# Patient Record
Sex: MALE | Race: ASIAN | Employment: OTHER | URBAN - METROPOLITAN AREA
[De-identification: names, ages, dates, MRNs, and addresses within clinical notes are randomized per-mention and may not be internally consistent; named-entity substitution may affect disease eponyms.]

---

## 2019-12-23 ENCOUNTER — HOSPITAL ENCOUNTER (INPATIENT)
Facility: HOSPITAL | Age: 79
LOS: 3 days | Discharge: HOME | DRG: 272 | End: 2019-12-26
Attending: EMERGENCY MEDICINE | Admitting: SURGERY
Payer: MEDICAID

## 2019-12-23 ENCOUNTER — APPOINTMENT (OUTPATIENT)
Dept: GENERAL RADIOLOGY | Facility: HOSPITAL | Age: 79
DRG: 272 | End: 2019-12-23
Attending: EMERGENCY MEDICINE
Payer: MEDICAID

## 2019-12-23 ENCOUNTER — APPOINTMENT (OUTPATIENT)
Dept: INTERVENTIONAL RADIOLOGY/VASCULAR | Facility: HOSPITAL | Age: 79
DRG: 272 | End: 2019-12-23
Attending: SURGERY
Payer: MEDICAID

## 2019-12-23 ENCOUNTER — APPOINTMENT (OUTPATIENT)
Dept: ULTRASOUND IMAGING | Facility: HOSPITAL | Age: 79
DRG: 272 | End: 2019-12-23
Attending: EMERGENCY MEDICINE
Payer: MEDICAID

## 2019-12-23 DIAGNOSIS — D64.9 NORMOCYTIC ANEMIA: ICD-10-CM

## 2019-12-23 DIAGNOSIS — I82.4Y1 ACUTE DEEP VEIN THROMBOSIS (DVT) OF PROXIMAL VEIN OF RIGHT LOWER EXTREMITY (HCC): Primary | ICD-10-CM

## 2019-12-23 PROCEDURE — 04CH3ZZ EXTIRPATION OF MATTER FROM RIGHT EXTERNAL ILIAC ARTERY, PERCUTANEOUS APPROACH: ICD-10-PCS | Performed by: SURGERY

## 2019-12-23 PROCEDURE — 93971 EXTREMITY STUDY: CPT | Performed by: EMERGENCY MEDICINE

## 2019-12-23 PROCEDURE — 04CK3ZZ EXTIRPATION OF MATTER FROM RIGHT FEMORAL ARTERY, PERCUTANEOUS APPROACH: ICD-10-PCS | Performed by: SURGERY

## 2019-12-23 PROCEDURE — 04CM3ZZ EXTIRPATION OF MATTER FROM RIGHT POPLITEAL ARTERY, PERCUTANEOUS APPROACH: ICD-10-PCS | Performed by: SURGERY

## 2019-12-23 PROCEDURE — 3E05317 INTRODUCTION OF OTHER THROMBOLYTIC INTO PERIPHERAL ARTERY, PERCUTANEOUS APPROACH: ICD-10-PCS | Performed by: SURGERY

## 2019-12-23 PROCEDURE — B519YZZ FLUOROSCOPY OF INFERIOR VENA CAVA USING OTHER CONTRAST: ICD-10-PCS | Performed by: SURGERY

## 2019-12-23 PROCEDURE — B51FYZZ FLUOROSCOPY OF RIGHT PELVIC (ILIAC) VEINS USING OTHER CONTRAST: ICD-10-PCS | Performed by: SURGERY

## 2019-12-23 PROCEDURE — 99223 1ST HOSP IP/OBS HIGH 75: CPT | Performed by: HOSPITALIST

## 2019-12-23 PROCEDURE — 71045 X-RAY EXAM CHEST 1 VIEW: CPT | Performed by: EMERGENCY MEDICINE

## 2019-12-23 PROCEDURE — B51BYZZ FLUOROSCOPY OF RIGHT LOWER EXTREMITY VEINS USING OTHER CONTRAST: ICD-10-PCS | Performed by: SURGERY

## 2019-12-23 PROCEDURE — 06HY33Z INSERTION OF INFUSION DEVICE INTO LOWER VEIN, PERCUTANEOUS APPROACH: ICD-10-PCS | Performed by: SURGERY

## 2019-12-23 RX ORDER — MIDAZOLAM HYDROCHLORIDE 1 MG/ML
INJECTION INTRAMUSCULAR; INTRAVENOUS
Status: COMPLETED
Start: 2019-12-23 | End: 2019-12-23

## 2019-12-23 RX ORDER — GLIMEPIRIDE 2 MG/1
2 TABLET ORAL NIGHTLY
COMMUNITY
End: 2021-03-15 | Stop reason: ALTCHOICE

## 2019-12-23 RX ORDER — HEPARIN SODIUM AND DEXTROSE 10000; 5 [USP'U]/100ML; G/100ML
INJECTION INTRAVENOUS CONTINUOUS
Status: DISCONTINUED | OUTPATIENT
Start: 2019-12-23 | End: 2019-12-24

## 2019-12-23 RX ORDER — HYDRALAZINE HYDROCHLORIDE 20 MG/ML
INJECTION INTRAMUSCULAR; INTRAVENOUS
Status: COMPLETED
Start: 2019-12-23 | End: 2019-12-23

## 2019-12-23 RX ORDER — HEPARIN SODIUM AND DEXTROSE 10000; 5 [USP'U]/100ML; G/100ML
400 INJECTION INTRAVENOUS CONTINUOUS
Status: DISCONTINUED | OUTPATIENT
Start: 2019-12-23 | End: 2019-12-24

## 2019-12-23 RX ORDER — HEPARIN SODIUM 5000 [USP'U]/ML
80 INJECTION INTRAVENOUS; SUBCUTANEOUS ONCE
Status: COMPLETED | OUTPATIENT
Start: 2019-12-23 | End: 2019-12-23

## 2019-12-23 RX ORDER — MORPHINE SULFATE 4 MG/ML
4 INJECTION, SOLUTION INTRAMUSCULAR; INTRAVENOUS EVERY 2 HOUR PRN
Status: DISCONTINUED | OUTPATIENT
Start: 2019-12-23 | End: 2019-12-24

## 2019-12-23 RX ORDER — SODIUM CHLORIDE 9 MG/ML
INJECTION, SOLUTION INTRAVENOUS CONTINUOUS
Status: DISCONTINUED | OUTPATIENT
Start: 2019-12-23 | End: 2019-12-24

## 2019-12-23 RX ORDER — DEXTROSE AND SODIUM CHLORIDE 5; .45 G/100ML; G/100ML
INJECTION, SOLUTION INTRAVENOUS CONTINUOUS
Status: DISCONTINUED | OUTPATIENT
Start: 2019-12-23 | End: 2019-12-25

## 2019-12-23 RX ORDER — HYDRALAZINE HYDROCHLORIDE 20 MG/ML
10 INJECTION INTRAMUSCULAR; INTRAVENOUS
Status: DISCONTINUED | OUTPATIENT
Start: 2019-12-23 | End: 2019-12-26

## 2019-12-23 RX ORDER — HEPARIN SODIUM 10000 [USP'U]/100ML
INJECTION, SOLUTION INTRAVENOUS
Status: DISPENSED
Start: 2019-12-23 | End: 2019-12-24

## 2019-12-23 RX ORDER — HEPARIN SODIUM 5000 [USP'U]/ML
INJECTION, SOLUTION INTRAVENOUS; SUBCUTANEOUS
Status: COMPLETED
Start: 2019-12-23 | End: 2019-12-23

## 2019-12-23 RX ORDER — ACETAMINOPHEN 325 MG/1
650 TABLET ORAL EVERY 6 HOURS PRN
Status: DISCONTINUED | OUTPATIENT
Start: 2019-12-23 | End: 2019-12-26

## 2019-12-23 RX ORDER — AMLODIPINE BESYLATE 10 MG/1
10 TABLET ORAL NIGHTLY
Status: ON HOLD | COMMUNITY
End: 2019-12-26

## 2019-12-23 RX ORDER — MORPHINE SULFATE 4 MG/ML
2 INJECTION, SOLUTION INTRAMUSCULAR; INTRAVENOUS EVERY 2 HOUR PRN
Status: DISCONTINUED | OUTPATIENT
Start: 2019-12-23 | End: 2019-12-24

## 2019-12-23 RX ORDER — ONDANSETRON 2 MG/ML
4 INJECTION INTRAMUSCULAR; INTRAVENOUS EVERY 6 HOURS PRN
Status: DISCONTINUED | OUTPATIENT
Start: 2019-12-23 | End: 2019-12-24

## 2019-12-23 RX ORDER — DEXTROSE AND SODIUM CHLORIDE 5; .45 G/100ML; G/100ML
INJECTION, SOLUTION INTRAVENOUS CONTINUOUS
Status: DISCONTINUED | OUTPATIENT
Start: 2019-12-23 | End: 2019-12-24

## 2019-12-23 RX ORDER — MORPHINE SULFATE 4 MG/ML
1 INJECTION, SOLUTION INTRAMUSCULAR; INTRAVENOUS EVERY 2 HOUR PRN
Status: DISCONTINUED | OUTPATIENT
Start: 2019-12-23 | End: 2019-12-24

## 2019-12-23 RX ORDER — LIDOCAINE HYDROCHLORIDE 10 MG/ML
INJECTION, SOLUTION EPIDURAL; INFILTRATION; INTRACAUDAL; PERINEURAL
Status: COMPLETED
Start: 2019-12-23 | End: 2019-12-23

## 2019-12-23 RX ORDER — AMLODIPINE BESYLATE 10 MG/1
10 TABLET ORAL NIGHTLY
Status: DISCONTINUED | OUTPATIENT
Start: 2019-12-23 | End: 2019-12-25

## 2019-12-23 RX ORDER — HEPARIN SODIUM AND DEXTROSE 10000; 5 [USP'U]/100ML; G/100ML
18 INJECTION INTRAVENOUS ONCE
Status: DISCONTINUED | OUTPATIENT
Start: 2019-12-23 | End: 2019-12-23

## 2019-12-23 RX ORDER — DEXTROSE MONOHYDRATE 25 G/50ML
50 INJECTION, SOLUTION INTRAVENOUS
Status: DISCONTINUED | OUTPATIENT
Start: 2019-12-23 | End: 2019-12-26

## 2019-12-23 RX ORDER — MORPHINE SULFATE 4 MG/ML
INJECTION, SOLUTION INTRAMUSCULAR; INTRAVENOUS
Status: DISPENSED
Start: 2019-12-23 | End: 2019-12-24

## 2019-12-23 RX ORDER — AMLODIPINE BESYLATE 10 MG/1
10 TABLET ORAL NIGHTLY
Status: DISCONTINUED | OUTPATIENT
Start: 2019-12-24 | End: 2019-12-25

## 2019-12-23 NOTE — H&P
[facility]  History and Physical     Abi Huyen Patient Status:  Emergency    1940 MRN [id number]   Location [address] [facility] Attending Radha Ch MD   Hosp Day # 0 PCP [facility]     Chief Complaint: Right Moist mucous membranes. Respiratory: Clear to auscultation bilaterally. Cardiovascular: S1, S2. Regular rate and rhythm. Equal pulses. Abdomen: Soft, nontender, nondistended. Positive bowel sounds. No rebound, guarding or organomegaly.   Musculoskele

## 2019-12-23 NOTE — PROGRESS NOTES
Patient seen and examined  Full consult to follow   Will plan catheter directed thrombolysis later today

## 2019-12-23 NOTE — ED PROVIDER NOTES
Patient Seen in: [facility] Emergency Department      History   Patient presents with:  Swelling Edema    Stated Complaint: R lower ext swelling, denies injury     HPI    19-year-old male comes in the hospital complaint of having difficulty with swe PEERL, throat clear, neck supple, no JVD, trachea midline, No LAD  Heart: S1S2 normal. No murmurs, regular rate and rhythm  Lungs: Clear to auscultation bilaterally  Abdomen: Soft nontender nondistended normal active bowel sounds without rebound, guarding volumes. Mild cardiomegaly with minimal vascular redistribution. No interstitial edema or CHF. No significant effusion. Trace discoid  atelectasis in the lung bases. No pneumothorax.     Dictated by: Jack Yadav MD on 12/23/2019 at 12:23     Chandra Baker

## 2019-12-23 NOTE — CONSULTS
[facility]  Vascular Surgery Consultation    Dontrell Anneanastacio Patient Status:  Emergency    1940 MRN [id number]   Location [address] [facility] [address] Attending No att. providers found   Hosp Day # 0 PCP Bill Hutchins     Reason for Consu thyroid wnl  CAROTID: No bruits  RESPIRATORY: no rales, rhonchi, or wheezes B  CARDIO: RRR without murmur, no murmur, no gallop   ABDOMEN: soft, non-tender with no palpable aneurysm or masses  BACK: normal, no tenderness  SKIN: no rashes, no suspicious les

## 2019-12-23 NOTE — ED NOTES
TALKED TO RAMSEY  THE PATIENT HAS A BRACELET ON THAT CAN NOT COME OFF  THEY ARE OK WITH THE BRACELET STAYING ON

## 2019-12-23 NOTE — BRIEF OP NOTE
Pre-Operative Diagnosis: Right iliofemoral DVT     Post-Operative Diagnosis: Same     Procedure Performed:   1. Ultrasound-guided percutaneous access right popliteal vein  2. Venogram and venacavogram  3. AngioJet thrombectomy  4.   Placement of Cragg McN

## 2019-12-23 NOTE — ED INITIAL ASSESSMENT (HPI)
Pt to ed with rpts of rt leg swelling that began 2mos ago, began to \"go up leg a month ago. \" pain 8/10

## 2019-12-24 ENCOUNTER — APPOINTMENT (OUTPATIENT)
Dept: INTERVENTIONAL RADIOLOGY/VASCULAR | Facility: HOSPITAL | Age: 79
DRG: 272 | End: 2019-12-24
Attending: SURGERY
Payer: MEDICAID

## 2019-12-24 PROCEDURE — 06CM3ZZ EXTIRPATION OF MATTER FROM RIGHT FEMORAL VEIN, PERCUTANEOUS APPROACH: ICD-10-PCS | Performed by: SURGERY

## 2019-12-24 PROCEDURE — B519YZZ FLUOROSCOPY OF INFERIOR VENA CAVA USING OTHER CONTRAST: ICD-10-PCS | Performed by: SURGERY

## 2019-12-24 PROCEDURE — 067M3ZZ DILATION OF RIGHT FEMORAL VEIN, PERCUTANEOUS APPROACH: ICD-10-PCS | Performed by: SURGERY

## 2019-12-24 PROCEDURE — 99231 SBSQ HOSP IP/OBS SF/LOW 25: CPT | Performed by: HOSPITALIST

## 2019-12-24 PROCEDURE — 067C3ZZ DILATION OF RIGHT COMMON ILIAC VEIN, PERCUTANEOUS APPROACH: ICD-10-PCS | Performed by: SURGERY

## 2019-12-24 PROCEDURE — 3E03317 INTRODUCTION OF OTHER THROMBOLYTIC INTO PERIPHERAL VEIN, PERCUTANEOUS APPROACH: ICD-10-PCS | Performed by: SURGERY

## 2019-12-24 PROCEDURE — 06CF3ZZ EXTIRPATION OF MATTER FROM RIGHT EXTERNAL ILIAC VEIN, PERCUTANEOUS APPROACH: ICD-10-PCS | Performed by: SURGERY

## 2019-12-24 PROCEDURE — 99255 IP/OBS CONSLTJ NEW/EST HI 80: CPT | Performed by: INTERNAL MEDICINE

## 2019-12-24 PROCEDURE — B51FYZZ FLUOROSCOPY OF RIGHT PELVIC (ILIAC) VEINS USING OTHER CONTRAST: ICD-10-PCS | Performed by: SURGERY

## 2019-12-24 PROCEDURE — B51BYZZ FLUOROSCOPY OF RIGHT LOWER EXTREMITY VEINS USING OTHER CONTRAST: ICD-10-PCS | Performed by: SURGERY

## 2019-12-24 RX ORDER — METOPROLOL TARTRATE 5 MG/5ML
5 INJECTION INTRAVENOUS ONCE
Status: COMPLETED | OUTPATIENT
Start: 2019-12-24 | End: 2019-12-24

## 2019-12-24 RX ORDER — HEPARIN SODIUM AND DEXTROSE 10000; 5 [USP'U]/100ML; G/100ML
INJECTION INTRAVENOUS CONTINUOUS
Status: DISCONTINUED | OUTPATIENT
Start: 2019-12-24 | End: 2019-12-25

## 2019-12-24 RX ORDER — HEPARIN SODIUM AND DEXTROSE 10000; 5 [USP'U]/100ML; G/100ML
18 INJECTION INTRAVENOUS ONCE
Status: DISCONTINUED | OUTPATIENT
Start: 2019-12-24 | End: 2019-12-26 | Stop reason: ALTCHOICE

## 2019-12-24 RX ORDER — METOPROLOL TARTRATE 5 MG/5ML
INJECTION INTRAVENOUS
Status: DISPENSED
Start: 2019-12-24 | End: 2019-12-25

## 2019-12-24 RX ORDER — LABETALOL HYDROCHLORIDE 5 MG/ML
10 INJECTION, SOLUTION INTRAVENOUS EVERY 4 HOURS PRN
Status: DISCONTINUED | OUTPATIENT
Start: 2019-12-24 | End: 2019-12-26

## 2019-12-24 RX ORDER — HEPARIN SODIUM 5000 [USP'U]/ML
INJECTION, SOLUTION INTRAVENOUS; SUBCUTANEOUS
Status: COMPLETED
Start: 2019-12-24 | End: 2019-12-24

## 2019-12-24 RX ORDER — MORPHINE SULFATE 4 MG/ML
4 INJECTION, SOLUTION INTRAMUSCULAR; INTRAVENOUS EVERY 2 HOUR PRN
Status: DISCONTINUED | OUTPATIENT
Start: 2019-12-24 | End: 2019-12-26

## 2019-12-24 RX ORDER — HEPARIN SODIUM AND DEXTROSE 10000; 5 [USP'U]/100ML; G/100ML
INJECTION INTRAVENOUS CONTINUOUS
Status: DISCONTINUED | OUTPATIENT
Start: 2019-12-24 | End: 2019-12-24

## 2019-12-24 RX ORDER — MORPHINE SULFATE 4 MG/ML
2 INJECTION, SOLUTION INTRAMUSCULAR; INTRAVENOUS EVERY 2 HOUR PRN
Status: DISCONTINUED | OUTPATIENT
Start: 2019-12-24 | End: 2019-12-26

## 2019-12-24 RX ORDER — MORPHINE SULFATE 4 MG/ML
1 INJECTION, SOLUTION INTRAMUSCULAR; INTRAVENOUS EVERY 2 HOUR PRN
Status: DISCONTINUED | OUTPATIENT
Start: 2019-12-24 | End: 2019-12-26

## 2019-12-24 RX ORDER — MIDAZOLAM HYDROCHLORIDE 1 MG/ML
INJECTION INTRAMUSCULAR; INTRAVENOUS
Status: COMPLETED
Start: 2019-12-24 | End: 2019-12-24

## 2019-12-24 RX ORDER — LIDOCAINE HYDROCHLORIDE 10 MG/ML
INJECTION, SOLUTION EPIDURAL; INFILTRATION; INTRACAUDAL; PERINEURAL
Status: COMPLETED
Start: 2019-12-24 | End: 2019-12-24

## 2019-12-24 NOTE — CONSULTS
Hematology Initial Consultation Note    Patient Name: Ivy Castrejon  Medical Record Number: [id number]    YOB: 1940   Date of Consultation: 12/24/2019   Physician requesting consultation: Dr. Pk Bradley    Reason for Consultation:  Lisa Sim fever but unknown whether or not his leg had increased warmth or erythema at that time. No increased dyspnea or chest pain. No prior history of VTE. No family history of blood clots.   Patient without any personal history of malignancy or known vasc file    Family Medical History:  Family History   Problem Relation Age of Onset   • DVT/VTE Neg      Review of Systems:  A 10-point ROS was done with pertinent positives and negative per the HPI    Vital Signs:  Height: --  Weight: 82.4 kg (181 lb 10.5 oz) Eventually will plan to switch to xarelto once PV surgery feels is acceptable.   Will need to complete at least 3 months of anticoagulation    *cellulitis?/adenopathy  -R inguinal LN enlarged on doppler, if had cellulitis this could be the cause, if never h

## 2019-12-24 NOTE — PROGRESS NOTES
[facility]  Progress Note     Dolphus Gutting Patient Status:  Inpatient    1940 MRN [id number]   [facility] 6NE-A Attending J Carlos Harvey MD;Tail*   Hosp Day # 1 PCP [facility]     Chief Complaint: DVT    S: Patient and fa sp thrombectomy, started on lytics 12/23  1. Heparin drip  2. TPA  3. Bedrest  4. Would be in favor of CTA chest when able  5. Hematology consult to assist with anticoagulants  6. Vascular following  2. Diabetes mellitus, A1c 7.3  1. Hold oral agents  2.  C

## 2019-12-24 NOTE — PLAN OF CARE
Patient VSS. Right leg fountain catheter intact and infusion TPA and Heparin gtt. Heparin gtt also through peripheral line. Continues to be flat. NPO for relook and thrombectomy.   Patient able to void with urinal.  Plan of care reviewed and updated wit

## 2019-12-24 NOTE — BRIEF OP NOTE
Pre-Operative Diagnosis: Right iliofemoral DVT, follow-up catheter directed thrombolysis     Post-Operative Diagnosis: Same     Procedure Performed:   1. Venogram through existing sheath  2.   AngioJet thrombectomy of right common femoral external iliac ve

## 2019-12-24 NOTE — PAYOR COMM NOTE
--------------  ADMISSION REVIEW     Payor: Andrew Toby #:  [id number]  Authorization Number: [id number]    Admit date: 12/23/19  Admit time: 1830       Admitting Physician: Leonarda Meyers MD  Attending Physician:  Saman Morris MD  Primary Care Alcohol use: Not on file    Drug use: Not on file             Review of Systems    Positive for stated complaint: R lower ext swelling, denies injury   Other systems are as noted in HPI. Constitutional and vital signs reviewed.       All other systems re Please view results for these tests on the individual orders. PROTHROMBIN TIME (PT)   PTT, ACTIVATED   RAINBOW DRAW BLUE   RAINBOW DRAW LAVENDER   RAINBOW DRAW LIGHT GREEN   RAINBOW DRAW GOLD     EKG    Rate, intervals and axes as noted on EKG Report.   R Acute deep vein thrombosis (DVT) of proximal vein of right lower extremity (HCC) I82.4Y1 12/23/2019 Unknown                   Signed by Prosper Beckman MD on 12/23/2019  1:31 PM            H&P - H&P Note      H&P signed by Mikhail Burt MD at 12/23/201 amLODIPine Besylate 10 MG Oral Tab, Take 10 mg by mouth nightly., Disp: , Rfl:   glimepiride 2 MG Oral Tab, Take 2 mg by mouth nightly., Disp: , Rfl:         Review of Systems:   A comprehensive 14 point review of systems was completed.     Pertinent positi Plan of care discussed with patient, family, ER and surgeon.      Uma Guerrero MD  12/23/2019                      Electronically signed by Mikhail Burt MD on 12/23/2019  4:07 PM         MEDICATIONS ADMINISTERED IN LAST 1 DAY:  alteplase (ACTIVASE) 5 12/23/2019 1848 Given 10 mg Intravenous Yaakov Ram RN      iodixanol (VISIPAQUE) 320 MG/ML injection 150 mL     Date Action Dose Route User    12/24/2019 0802 Given 50 mL Injection Nacho Sanford MD      iodixanol (VISIPAQUE) 320 MG/ML inject Pre-Operative Diagnosis: Right iliofemoral DVT     Post-Operative Diagnosis: Same     Procedure Performed:   1. Ultrasound-guided percutaneous access right popliteal vein  2. Venogram and venacavogram  3. AngioJet thrombectomy  4.   Placement of Cragg McN PTP 14.3 13.5 13.9   INR 1.06 0.99 1.03      Medications:   • amLODIPine Besylate  10 mg Oral Nightly   • Insulin Aspart Pen  1-5 Units Subcutaneous TID CC and HS   • amLODIPine Besylate  10 mg Oral Nightly   • Heparin Sod (Porcine) in D5W         • morphI 2.  Post AngioJet thrombectomy and balloon angioplasty restoration of venous flow through common femoral vein, external iliac vein, common iliac vein and vena cava, there appeared to be a intravascular web in the distal common iliac vein/proximal external

## 2019-12-25 PROCEDURE — 99232 SBSQ HOSP IP/OBS MODERATE 35: CPT | Performed by: INTERNAL MEDICINE

## 2019-12-25 PROCEDURE — 99233 SBSQ HOSP IP/OBS HIGH 50: CPT | Performed by: HOSPITALIST

## 2019-12-25 RX ORDER — HEPARIN SODIUM AND DEXTROSE 10000; 5 [USP'U]/100ML; G/100ML
INJECTION INTRAVENOUS CONTINUOUS
Status: ACTIVE | OUTPATIENT
Start: 2019-12-25 | End: 2019-12-25

## 2019-12-25 RX ORDER — FOLIC ACID 1 MG/1
1 TABLET ORAL DAILY
Status: DISCONTINUED | OUTPATIENT
Start: 2019-12-25 | End: 2019-12-26

## 2019-12-25 RX ORDER — METOPROLOL SUCCINATE 25 MG/1
25 TABLET, EXTENDED RELEASE ORAL
Status: DISCONTINUED | OUTPATIENT
Start: 2019-12-26 | End: 2019-12-26

## 2019-12-25 RX ORDER — MELATONIN
1000 DAILY
Status: DISCONTINUED | OUTPATIENT
Start: 2019-12-25 | End: 2019-12-26

## 2019-12-25 RX ORDER — LOSARTAN POTASSIUM 100 MG/1
100 TABLET ORAL NIGHTLY
Status: DISCONTINUED | OUTPATIENT
Start: 2019-12-25 | End: 2019-12-26

## 2019-12-25 NOTE — PROGRESS NOTES
Hematology Progress Note    Patient Name: Maria M Munoz  Medical Record Number: [id number]    YOB: 1940     Reason for Consultation:  Maria M Munoz was seen today for the diagnosis of RLE DVT    Interval events: RLE swelling and pain much imp in this interval not displayed.        Recent Labs   Lab 12/23/19  1201 12/24/19  0508 12/25/19  0542    137 138   K 3.8 3.5 3.9    104 105   CO2 26.0 27.0 29.0   BUN 18 10 9   CREATSERUM 1.06 0.74 0.86   GFRAA 76 101 95   GFRNAA 66 87 82   GLU doppler, if had cellulitis this could be the cause, if never had cellulitis would be less common to have LAD from a DVT only.     -will need reassessment after acute issues resolve, if persistent adenopathy is present would need to evaluate further for [facility]

## 2019-12-25 NOTE — PROGRESS NOTES
[facility]  Progress Note     Cameronelva Huyen Patient Status:  Inpatient    1940 MRN [id number]   [facility] 7NE-A Attending Cordell Alcantara MD   Hosp Day # 2 PCP K[facility]     Chief Complaint: leg pain   S:  Patient denies (PE/DVT)  18 Units/kg/hr Intravenous Once   • metoprolol tartrate  25 mg Oral 2x Daily(Beta Blocker)   • amLODIPine Besylate  10 mg Oral Nightly   • Insulin Aspart Pen  1-5 Units Subcutaneous TID CC and HS   • amLODIPine Besylate  10 mg Oral Nightly     AS

## 2019-12-25 NOTE — PLAN OF CARE
Assumed care of pt 1930. Aox4. Son at bedside. Rt leg with nonpitting edema, elevated on 2 pillows. Rt popliteal site with dressing C/D/I, site soft. Rating pain 1/10, refusing prn pain meds. Rt pedal pulse 1+, verified by doppler.  Rt post tibial pulse by

## 2019-12-25 NOTE — PLAN OF CARE
Assumed care of pt at 0730. PT taken to Cath lab for thrombectomy and removal of fountain catheter from R popliteal area. PT received back at 10am. PT started on heparin drip PE/DVT protocol (18units/kg/hr) or 1500 units/hr as ordered by Dr Juan Woodard.  Pt main

## 2019-12-25 NOTE — PROGRESS NOTES
Vascular surgery progress note    /45 (BP Location: Left arm)   Pulse 81   Temp 97.7 °F (36.5 °C) (Oral)   Resp 25   Wt 181 lb 10.5 oz (82.4 kg)   SpO2 96%   Recent Labs   Lab 12/23/19  1201 12/23/19  1937 12/24/19  0205 12/24/19  0508   RBC 4.13 4. 0

## 2019-12-26 VITALS
RESPIRATION RATE: 23 BRPM | DIASTOLIC BLOOD PRESSURE: 57 MMHG | TEMPERATURE: 98 F | WEIGHT: 181.69 LBS | HEART RATE: 83 BPM | SYSTOLIC BLOOD PRESSURE: 159 MMHG | OXYGEN SATURATION: 97 %

## 2019-12-26 DIAGNOSIS — D64.9 NORMOCYTIC ANEMIA: Primary | ICD-10-CM

## 2019-12-26 DIAGNOSIS — R59.0 INGUINAL ADENOPATHY: ICD-10-CM

## 2019-12-26 DIAGNOSIS — I82.4Y1 ACUTE DEEP VEIN THROMBOSIS (DVT) OF PROXIMAL VEIN OF RIGHT LOWER EXTREMITY (HCC): ICD-10-CM

## 2019-12-26 PROCEDURE — 99239 HOSP IP/OBS DSCHRG MGMT >30: CPT | Performed by: HOSPITALIST

## 2019-12-26 PROCEDURE — 99232 SBSQ HOSP IP/OBS MODERATE 35: CPT | Performed by: INTERNAL MEDICINE

## 2019-12-26 RX ORDER — METOPROLOL SUCCINATE 25 MG/1
25 TABLET, EXTENDED RELEASE ORAL
Qty: 30 TABLET | Refills: 0 | Status: SHIPPED | OUTPATIENT
Start: 2019-12-27 | End: 2020-01-26

## 2019-12-26 RX ORDER — VALSARTAN 160 MG/1
160 TABLET ORAL DAILY
Qty: 30 TABLET | Refills: 0 | Status: SHIPPED | OUTPATIENT
Start: 2019-12-26 | End: 2019-12-26

## 2019-12-26 RX ORDER — LOSARTAN POTASSIUM 100 MG/1
100 TABLET ORAL NIGHTLY
Qty: 30 TABLET | Refills: 0 | Status: SHIPPED | OUTPATIENT
Start: 2019-12-26 | End: 2021-03-15 | Stop reason: ALTCHOICE

## 2019-12-26 RX ORDER — FOLIC ACID 1 MG/1
1 TABLET ORAL DAILY
Qty: 90 TABLET | Refills: 3 | Status: SHIPPED | OUTPATIENT
Start: 2019-12-26 | End: 2021-03-15 | Stop reason: ALTCHOICE

## 2019-12-26 RX ORDER — ACETAMINOPHEN 500 MG
1000 TABLET ORAL EVERY 6 HOURS PRN
Status: DISCONTINUED | OUTPATIENT
Start: 2019-12-26 | End: 2019-12-26

## 2019-12-26 NOTE — PLAN OF CARE
Assumed care at [address]. Pt a/ox4. Ifeoma speaking, family at bedside at all times. VSS. NSR per tele. RA. Denies pain. Right leg non pitting edema, popliteal incision w/ dressing c/d/i. Pt updated w/ POC. Will continue to monitor. Plan to dc home later today.

## 2019-12-26 NOTE — PLAN OF CARE
Assumed care @ 0700  R popliteal dressing removed. Nonpitting edema RLE. Elevated on pillows. Pulses palpable  Ambulated in the garcia with therapy        NURSING DISCHARGE NOTE    Discharged Home via Wheelchair.   Accompanied by Support staff  [facility]

## 2019-12-26 NOTE — PHYSICAL THERAPY NOTE
PHYSICAL THERAPY QUICK EVALUATION - INPATIENT    Room Number: 6415/7863-A  Evaluation Date: 12/26/2019  Presenting Problem: RLE DVT, s/p thrombectomy  Physician Order: PT Eval and Treat    Problem List  Principal Problem:    Acute deep vein thrombosis (D currently need. ..   -   Moving to and from a bed to a chair (including a wheelchair)?: None   -   Need to walk in hospital room?: None   -   Climbing 3-5 steps with a railing?: None       AM-PAC Score:  Raw Score: 24   Approx Degree of Impairment: 0%   Sta

## 2019-12-26 NOTE — PROGRESS NOTES
[facility]  Progress Note     Umm Farrell Patient Status:  Inpatient    1940 MRN [id number]   [facility] 7NE-A Attending Sony Delgado MD   Hosp Day # 3 PCP [facility]     Chief Complaint: leg pain   S:  Patient denies Epic.  Medications:   • metoprolol succinate  25 mg Oral Daily Beta Blocker   • losartan  100 mg Oral Nightly   • folic acid  1 mg Oral Daily   • Vitamin B-12  1,000 mcg Oral Daily   • rivaroxaban  15 mg Oral BID with meals   • Initial dose Heparin infusio

## 2019-12-26 NOTE — PROCEDURES
[facility]    PATIENT'S NAME: Milton Yen   ATTENDING PHYSICIAN: Wanda Tom M.D. OPERATING PHYSICIAN: Vanessa Moyer M.D.    PATIENT ACCOUNT#:   [de-identified]    LOCATION:  [address] [place] [address]  MEDICAL RECORD #:   [id number]       DATE OF BIRTH: He returns this morning for a followup. DETAILS OF THE PROCEDURE:  The patient was taken to the catheterization lab, prepped and draped in a sterile fashion. Moderate conscious sedation was initiated with a qualified nurse supervising.   Over the preex

## 2019-12-26 NOTE — PROGRESS NOTES
Hematology Progress Note    Patient Name: Dontrell Wolf  Medical Record Number: [id number]    YOB: 1940     Reason for Consultation:  Dontrell Wolf was seen today for the diagnosis of RLE DVT    Interval events: RLE swelling and pain continue Lab 12/23/19  1201 12/24/19  0508 12/25/19  0542    137 138   K 3.8 3.5 3.9    104 105   CO2 26.0 27.0 29.0   BUN 18 10 9   CREATSERUM 1.06 0.74 0.86   GFRAA 76 101 95   GFRNAA 66 87 82   * 105* 133*   CA 8.4* 8.2* 8.3*   TP 7.3 7.0  - could be the cause, if never had cellulitis would be less common to have LAD from a DVT only.     -will need reassessment after acute issues resolve, if persistent adenopathy is present would need to evaluate further for malignancy  -ESR, CRP only mildly el

## 2019-12-27 NOTE — PAYOR COMM NOTE
REQUESTING 3 INPT DAYS    DISCHARGE REVIEW    Payor: [address] #:  [id number]  Authorization Number: [id number]    Admit date: 12/23/19  Admit time:  1830  Discharge Date: 12/26/2019  2:04 PM     Admitting Physician: Julio C Saha MD  Attending

## 2019-12-27 NOTE — PROCEDURES
[facility]    PATIENT'S NAME: Boo Osorio   ATTENDING PHYSICIAN: Jose Angel Littlejohn M.D. OPERATING PHYSICIAN: John Ramos M.D.    PATIENT ACCOUNT#:   [de-identified]    LOCATION:  [address] [place] [address]  MEDICAL RECORD #:   [id number]       DATE OF BIRTH: prone position. Using ultrasound, I identified the thrombosed popliteal vein and placed a micropuncture sheath under ultrasound.   I did an angiogram through the micropuncture sheath and confirmed I was intravenous and then advanced a Glidewire Advantage w

## 2019-12-27 NOTE — DISCHARGE SUMMARY
[facility]ITAL[facility]  DISCHARGE SUMMARY     Cristoferanndo Javy Patient Status:  Inpatient    1940 MRN [id number]   [facility] 7NE-A Attending No att. providers found   Hosp Day # 3 PCP Bill Pleitez 92     Date of Admission: 2019  Date of with pulses. Pt converted to oral anticoagulation. BP meds were adjusted per family request.  Patient medically stable for DC and cleared by consultants.      Procedures during hospitalization:   · Venogram through existing sheath  · AngioJet thrombectomy GLUCOPHAGE      Take 850 mg by mouth 2 (two) times daily with meals.    Refills:  0        STOP taking these medications    amLODIPine Besylate 10 MG Tabs  Commonly known as:  NORVASC              Where to Get Your Medications      These medications were se

## 2019-12-30 ENCOUNTER — TELEPHONE (OUTPATIENT)
Dept: HEMATOLOGY/ONCOLOGY | Facility: HOSPITAL | Age: 79
End: 2019-12-30

## 2019-12-30 NOTE — TELEPHONE ENCOUNTER
----- Message from Marilyn Middleton MD sent at 12/27/2019  3:35 PM CST -----  Please call son and advise that he needs to follow up with a different hematologist in his network within the next month few weeks and to have then request our records.      Than

## 2020-01-10 PROBLEM — I82.421 ACUTE DEEP VEIN THROMBOSIS (DVT) OF RIGHT ILIOFEMORAL VEIN (HCC): Status: ACTIVE | Noted: 2020-01-10

## 2021-03-09 ENCOUNTER — ORDER TRANSCRIPTION (OUTPATIENT)
Dept: ADMINISTRATIVE | Facility: HOSPITAL | Age: 81
End: 2021-03-09

## 2021-03-09 DIAGNOSIS — M25.552 LEFT HIP PAIN: Primary | ICD-10-CM

## 2021-03-09 DIAGNOSIS — M54.50 LOW BACK PAIN: Primary | ICD-10-CM

## 2021-03-10 ENCOUNTER — HOSPITAL ENCOUNTER (OUTPATIENT)
Dept: ULTRASOUND IMAGING | Facility: HOSPITAL | Age: 81
Discharge: HOME | End: 2021-03-10
Attending: FAMILY MEDICINE
Payer: MEDICAID

## 2021-03-10 ENCOUNTER — APPOINTMENT (OUTPATIENT)
Dept: ULTRASOUND IMAGING | Facility: HOSPITAL | Age: 81
End: 2021-03-10
Attending: FAMILY MEDICINE
Payer: MEDICAID

## 2021-03-10 ENCOUNTER — HOSPITAL ENCOUNTER (OUTPATIENT)
Dept: GENERAL RADIOLOGY | Facility: HOSPITAL | Age: 81
Discharge: HOME | End: 2021-03-10
Attending: FAMILY MEDICINE
Payer: MEDICAID

## 2021-03-10 DIAGNOSIS — M79.604 PAIN IN BOTH LOWER EXTREMITIES: ICD-10-CM

## 2021-03-10 DIAGNOSIS — M54.41 MIDLINE LOW BACK PAIN WITH BILATERAL SCIATICA, UNSPECIFIED CHRONICITY: ICD-10-CM

## 2021-03-10 DIAGNOSIS — M54.50 LOW BACK PAIN: ICD-10-CM

## 2021-03-10 DIAGNOSIS — I82.411 DEEP VEIN THROMBOSIS (DVT) OF FEMORAL VEIN OF RIGHT LOWER EXTREMITY, UNSPECIFIED CHRONICITY (HCC): ICD-10-CM

## 2021-03-10 DIAGNOSIS — M54.42 MIDLINE LOW BACK PAIN WITH BILATERAL SCIATICA, UNSPECIFIED CHRONICITY: ICD-10-CM

## 2021-03-10 DIAGNOSIS — M79.605 PAIN IN BOTH LOWER EXTREMITIES: ICD-10-CM

## 2021-03-10 DIAGNOSIS — M25.552 LEFT HIP PAIN: ICD-10-CM

## 2021-03-10 PROCEDURE — 72170 X-RAY EXAM OF PELVIS: CPT | Performed by: FAMILY MEDICINE

## 2021-03-10 PROCEDURE — 93970 EXTREMITY STUDY: CPT | Performed by: FAMILY MEDICINE

## 2021-03-10 PROCEDURE — 72100 X-RAY EXAM L-S SPINE 2/3 VWS: CPT | Performed by: FAMILY MEDICINE

## 2021-03-11 ENCOUNTER — HOSPITAL ENCOUNTER (OUTPATIENT)
Dept: CT IMAGING | Facility: HOSPITAL | Age: 81
Discharge: HOME | End: 2021-03-11
Attending: FAMILY MEDICINE
Payer: MEDICAID

## 2021-03-11 DIAGNOSIS — R19.03 ABDOMINAL MASS, RIGHT LOWER QUADRANT: ICD-10-CM

## 2021-03-11 LAB — CREAT BLD-MCNC: 1.6 MG/DL

## 2021-03-11 PROCEDURE — 82565 ASSAY OF CREATININE: CPT

## 2021-03-11 PROCEDURE — 74177 CT ABD & PELVIS W/CONTRAST: CPT | Performed by: FAMILY MEDICINE

## 2021-03-12 ENCOUNTER — ORDER TRANSCRIPTION (OUTPATIENT)
Dept: ADMINISTRATIVE | Facility: HOSPITAL | Age: 81
End: 2021-03-12

## 2021-03-12 DIAGNOSIS — Z11.59 SPECIAL SCREENING EXAMINATION FOR VIRAL DISEASE: ICD-10-CM

## 2021-03-12 DIAGNOSIS — R19.00 RETROPERITONEAL MASS: ICD-10-CM

## 2021-03-12 DIAGNOSIS — Z01.818 PREOP EXAMINATION: ICD-10-CM

## 2021-03-12 DIAGNOSIS — C78.7 METASTASIS TO LIVER (HCC): Primary | ICD-10-CM

## 2021-03-15 ENCOUNTER — LAB ENCOUNTER (OUTPATIENT)
Dept: LAB | Age: 81
End: 2021-03-15
Payer: MEDICAID

## 2021-03-15 DIAGNOSIS — C78.7 METASTASIS TO LIVER (HCC): ICD-10-CM

## 2021-03-15 RX ORDER — TRAMADOL HYDROCHLORIDE 50 MG/1
50 TABLET ORAL EVERY 6 HOURS PRN
COMMUNITY

## 2021-03-15 RX ORDER — OMEPRAZOLE 20 MG/1
20 CAPSULE, DELAYED RELEASE ORAL EVERY MORNING
COMMUNITY

## 2021-03-15 NOTE — IMAGING NOTE
Called Dr Enedina Puentes office for a History and Physical to be done before his procedure on 03/17/21. The office took our fax# [phone].

## 2021-03-16 LAB — SARS-COV-2 RNA RESP QL NAA+PROBE: NOT DETECTED

## 2021-03-17 ENCOUNTER — NURSE ONLY (OUTPATIENT)
Dept: LAB | Facility: HOSPITAL | Age: 81
End: 2021-03-17
Attending: FAMILY MEDICINE
Payer: MEDICAID

## 2021-03-17 ENCOUNTER — HOSPITAL ENCOUNTER (OUTPATIENT)
Dept: CT IMAGING | Facility: HOSPITAL | Age: 81
Discharge: HOME | End: 2021-03-17
Attending: FAMILY MEDICINE
Payer: MEDICAID

## 2021-03-17 VITALS
HEART RATE: 72 BPM | DIASTOLIC BLOOD PRESSURE: 70 MMHG | WEIGHT: 135 LBS | SYSTOLIC BLOOD PRESSURE: 136 MMHG | TEMPERATURE: 97 F | OXYGEN SATURATION: 100 % | BODY MASS INDEX: 21.69 KG/M2 | RESPIRATION RATE: 16 BRPM | HEIGHT: 66 IN

## 2021-03-17 DIAGNOSIS — M89.8X9 METABOLIC BONE DISEASE: ICD-10-CM

## 2021-03-17 DIAGNOSIS — C78.7 METASTASES TO THE LIVER (HCC): ICD-10-CM

## 2021-03-17 DIAGNOSIS — C78.7 METASTASIS TO LIVER (HCC): Primary | ICD-10-CM

## 2021-03-17 DIAGNOSIS — E88.9 NUTRITIONAL AND METABOLIC CARDIOMYOPATHY (HCC): ICD-10-CM

## 2021-03-17 DIAGNOSIS — I43 NUTRITIONAL AND METABOLIC CARDIOMYOPATHY (HCC): ICD-10-CM

## 2021-03-17 DIAGNOSIS — C78.7 SECONDARY MALIGNANT NEOPLASM OF LIVER (HCC): Primary | ICD-10-CM

## 2021-03-17 DIAGNOSIS — R19.00 RETROPERITONEAL MASS: ICD-10-CM

## 2021-03-17 DIAGNOSIS — E63.9 NUTRITIONAL AND METABOLIC CARDIOMYOPATHY (HCC): ICD-10-CM

## 2021-03-17 DIAGNOSIS — R19.00 ABDOMINAL LUMP: ICD-10-CM

## 2021-03-17 LAB
AFP-TM SERPL-MCNC: 2.4 NG/ML (ref ?–8)
APTT PPP: 36.9 SECONDS (ref 25.4–36.1)
CANCER AG19-9 SERPL-ACNC: 26.2 U/ML (ref ?–37)
CEA SERPL-MCNC: 17.6 NG/ML (ref ?–5)
DEPRECATED RDW RBC AUTO: 42.7 FL (ref 35.1–46.3)
ERYTHROCYTE [DISTWIDTH] IN BLOOD BY AUTOMATED COUNT: 14.6 % (ref 11–15)
GLUCOSE BLD-MCNC: 85 MG/DL (ref 70–99)
HCT VFR BLD AUTO: 35.5 %
HGB BLD-MCNC: 10.9 G/DL
INR BLD: 1.12 (ref 0.89–1.11)
LDH SERPL L TO P-CCNC: 205 U/L
MCH RBC QN AUTO: 24.7 PG (ref 26–34)
MCHC RBC AUTO-ENTMCNC: 30.7 G/DL (ref 31–37)
MCV RBC AUTO: 80.5 FL
PLATELET # BLD AUTO: 425 10(3)UL (ref 150–450)
PSA SERPL DL<=0.01 NG/ML-MCNC: 14.8 SECONDS (ref 12.4–14.6)
PSA SERPL-MCNC: 20.3 NG/ML (ref ?–4)
RBC # BLD AUTO: 4.41 X10(6)UL
WBC # BLD AUTO: 6.2 X10(3) UL (ref 4–11)

## 2021-03-17 PROCEDURE — 36410 VNPNXR 3YR/> PHY/QHP DX/THER: CPT

## 2021-03-17 PROCEDURE — 85730 THROMBOPLASTIN TIME PARTIAL: CPT

## 2021-03-17 PROCEDURE — 36415 COLL VENOUS BLD VENIPUNCTURE: CPT

## 2021-03-17 PROCEDURE — 88341 IMHCHEM/IMCYTCHM EA ADD ANTB: CPT | Performed by: FAMILY MEDICINE

## 2021-03-17 PROCEDURE — 88342 IMHCHEM/IMCYTCHM 1ST ANTB: CPT | Performed by: FAMILY MEDICINE

## 2021-03-17 PROCEDURE — 84153 ASSAY OF PSA TOTAL: CPT

## 2021-03-17 PROCEDURE — 85610 PROTHROMBIN TIME: CPT

## 2021-03-17 PROCEDURE — 83615 LACTATE (LD) (LDH) ENZYME: CPT

## 2021-03-17 PROCEDURE — 82962 GLUCOSE BLOOD TEST: CPT

## 2021-03-17 PROCEDURE — 77012 CT SCAN FOR NEEDLE BIOPSY: CPT | Performed by: FAMILY MEDICINE

## 2021-03-17 PROCEDURE — 99152 MOD SED SAME PHYS/QHP 5/>YRS: CPT | Performed by: FAMILY MEDICINE

## 2021-03-17 PROCEDURE — 82378 CARCINOEMBRYONIC ANTIGEN: CPT

## 2021-03-17 PROCEDURE — 47000 NEEDLE BIOPSY OF LIVER PERQ: CPT | Performed by: FAMILY MEDICINE

## 2021-03-17 PROCEDURE — 85027 COMPLETE CBC AUTOMATED: CPT

## 2021-03-17 PROCEDURE — 76937 US GUIDE VASCULAR ACCESS: CPT

## 2021-03-17 PROCEDURE — 88307 TISSUE EXAM BY PATHOLOGIST: CPT | Performed by: FAMILY MEDICINE

## 2021-03-17 PROCEDURE — 82105 ALPHA-FETOPROTEIN SERUM: CPT

## 2021-03-17 PROCEDURE — 86301 IMMUNOASSAY TUMOR CA 19-9: CPT

## 2021-03-17 RX ORDER — NALOXONE HYDROCHLORIDE 0.4 MG/ML
80 INJECTION, SOLUTION INTRAMUSCULAR; INTRAVENOUS; SUBCUTANEOUS AS NEEDED
Status: DISCONTINUED | OUTPATIENT
Start: 2021-03-17 | End: 2021-03-19

## 2021-03-17 RX ORDER — SODIUM CHLORIDE 9 MG/ML
INJECTION, SOLUTION INTRAVENOUS CONTINUOUS
Status: DISCONTINUED | OUTPATIENT
Start: 2021-03-17 | End: 2021-03-19

## 2021-03-17 RX ORDER — MIDAZOLAM HYDROCHLORIDE 1 MG/ML
INJECTION INTRAMUSCULAR; INTRAVENOUS
Status: COMPLETED
Start: 2021-03-17 | End: 2021-03-17

## 2021-03-17 RX ORDER — MIDAZOLAM HYDROCHLORIDE 1 MG/ML
1 INJECTION INTRAMUSCULAR; INTRAVENOUS EVERY 5 MIN PRN
Status: ACTIVE | OUTPATIENT
Start: 2021-03-17 | End: 2021-03-17

## 2021-03-17 RX ORDER — FLUMAZENIL 0.1 MG/ML
0.2 INJECTION, SOLUTION INTRAVENOUS AS NEEDED
Status: DISCONTINUED | OUTPATIENT
Start: 2021-03-17 | End: 2021-03-19

## 2021-03-17 RX ADMIN — MIDAZOLAM HYDROCHLORIDE 1 MG: 1 INJECTION INTRAMUSCULAR; INTRAVENOUS at 11:01:00

## 2021-03-17 RX ADMIN — SODIUM CHLORIDE: 9 INJECTION, SOLUTION INTRAVENOUS at 10:47:00

## 2021-03-17 NOTE — PROCEDURES
[facility]  Procedure Note    Monica Jasso Patient Status:  Outpatient    1940 MRN [id number]   [facility] CT Attending Sal, [address] Day # 0 LISA Atkins Current SAL     Procedure: right lobe liver mass    Pre-P

## 2021-03-17 NOTE — IMAGING NOTE
Received pt to [facility], accompanied by his son. Pt and son verified name and  as well as allergies. Pt states NPO since  last night and took no medications this morning.   Pt son states Chevy Woodard last dose was 3/12/21 and pt takes no other blood

## 2021-03-22 ENCOUNTER — TELEPHONE (OUTPATIENT)
Dept: HEMATOLOGY/ONCOLOGY | Age: 81
End: 2021-03-22

## 2021-03-22 NOTE — TELEPHONE ENCOUNTER
Patient's wife called regarding a consult referred by Dr. Chay Puentes. I made an appointment with Dr. Trae Phillips on April 15, 2021. Patient's wife asked me if I would ask the nurse if she could possibly get anything earlier than 4/15/21. Thank you.  Ruma

## 2021-03-31 ENCOUNTER — APPOINTMENT (OUTPATIENT)
Dept: HEMATOLOGY/ONCOLOGY | Facility: HOSPITAL | Age: 81
End: 2021-03-31
Attending: INTERNAL MEDICINE
Payer: MEDICAID

## 2021-04-14 ENCOUNTER — APPOINTMENT (OUTPATIENT)
Dept: ULTRASOUND IMAGING | Facility: HOSPITAL | Age: 81
End: 2021-04-14
Payer: MEDICAID

## 2021-04-14 ENCOUNTER — HOSPITAL ENCOUNTER (EMERGENCY)
Facility: HOSPITAL | Age: 81
Discharge: HOME | End: 2021-04-14
Payer: MEDICAID

## 2021-04-14 VITALS
HEIGHT: 65 IN | SYSTOLIC BLOOD PRESSURE: 138 MMHG | TEMPERATURE: 97 F | DIASTOLIC BLOOD PRESSURE: 61 MMHG | BODY MASS INDEX: 23.14 KG/M2 | OXYGEN SATURATION: 97 % | HEART RATE: 74 BPM | RESPIRATION RATE: 20 BRPM | WEIGHT: 138.88 LBS

## 2021-04-14 DIAGNOSIS — I82.5Y1 CHRONIC DEEP VEIN THROMBOSIS (DVT) OF PROXIMAL VEIN OF RIGHT LOWER EXTREMITY (HCC): Primary | ICD-10-CM

## 2021-04-14 PROCEDURE — 99284 EMERGENCY DEPT VISIT MOD MDM: CPT

## 2021-04-14 PROCEDURE — 93971 EXTREMITY STUDY: CPT

## 2021-04-14 RX ORDER — ABIRATERONE ACETATE 250 MG/1
250 TABLET ORAL DAILY
Status: ON HOLD | COMMUNITY
End: 2021-09-20

## 2021-04-14 RX ORDER — DEXAMETHASONE 2 MG/1
2 TABLET ORAL 2 TIMES DAILY WITH MEALS
Status: ON HOLD | COMMUNITY
End: 2021-09-20

## 2021-04-15 ENCOUNTER — APPOINTMENT (OUTPATIENT)
Dept: HEMATOLOGY/ONCOLOGY | Age: 81
End: 2021-04-15
Attending: INTERNAL MEDICINE
Payer: MEDICAID

## 2021-04-15 NOTE — ED PROVIDER NOTES
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

## 2021-09-19 ENCOUNTER — HOSPITAL ENCOUNTER (OUTPATIENT)
Facility: HOSPITAL | Age: 81
Setting detail: OBSERVATION
Discharge: HOME | End: 2021-09-23
Attending: EMERGENCY MEDICINE
Payer: MEDICAID

## 2021-09-19 DIAGNOSIS — E87.1 HYPONATREMIA: ICD-10-CM

## 2021-09-19 DIAGNOSIS — R79.89 AZOTEMIA: ICD-10-CM

## 2021-09-19 DIAGNOSIS — C79.51 PROSTATE CANCER METASTATIC TO BONE (HCC): ICD-10-CM

## 2021-09-19 DIAGNOSIS — K64.9 HEMORRHOIDS, UNSPECIFIED HEMORRHOID TYPE: ICD-10-CM

## 2021-09-19 DIAGNOSIS — D70.9 NEUTROPENIC FEVER (HCC): Primary | ICD-10-CM

## 2021-09-19 DIAGNOSIS — C61 PROSTATE CANCER METASTATIC TO BONE (HCC): ICD-10-CM

## 2021-09-19 DIAGNOSIS — D64.9 ANEMIA, UNSPECIFIED TYPE: ICD-10-CM

## 2021-09-19 DIAGNOSIS — R50.81 NEUTROPENIC FEVER (HCC): Primary | ICD-10-CM

## 2021-09-19 RX ORDER — SODIUM CHLORIDE 9 MG/ML
INJECTION, SOLUTION INTRAVENOUS CONTINUOUS
Status: DISCONTINUED | OUTPATIENT
Start: 2021-09-20 | End: 2021-09-23

## 2021-09-20 ENCOUNTER — APPOINTMENT (OUTPATIENT)
Dept: GENERAL RADIOLOGY | Facility: HOSPITAL | Age: 81
End: 2021-09-20
Attending: EMERGENCY MEDICINE
Payer: MEDICAID

## 2021-09-20 PROBLEM — D64.9 ANEMIA, UNSPECIFIED TYPE: Status: ACTIVE | Noted: 2021-09-20

## 2021-09-20 PROBLEM — R50.81 NEUTROPENIC FEVER (HCC): Status: ACTIVE | Noted: 2021-09-20

## 2021-09-20 PROBLEM — D70.9 NEUTROPENIC FEVER (HCC): Status: ACTIVE | Noted: 2021-09-20

## 2021-09-20 PROBLEM — D70.9 NEUTROPENIC FEVER: Status: ACTIVE | Noted: 2021-09-20

## 2021-09-20 PROBLEM — R79.89 AZOTEMIA: Status: ACTIVE | Noted: 2021-09-20

## 2021-09-20 PROBLEM — R50.81 NEUTROPENIC FEVER: Status: ACTIVE | Noted: 2021-09-20

## 2021-09-20 PROBLEM — E87.1 HYPONATREMIA: Status: ACTIVE | Noted: 2021-09-20

## 2021-09-20 LAB
ALBUMIN SERPL-MCNC: 2.4 G/DL (ref 3.4–5)
ALBUMIN/GLOB SERPL: 0.6 {RATIO} (ref 1–2)
ALP LIVER SERPL-CCNC: 189 U/L
ALT SERPL-CCNC: 21 U/L
ANION GAP SERPL CALC-SCNC: 5 MMOL/L (ref 0–18)
ANION GAP SERPL CALC-SCNC: 5 MMOL/L (ref 0–18)
AST SERPL-CCNC: 54 U/L (ref 15–37)
BASOPHILS # BLD: 0 X10(3) UL (ref 0–0.2)
BASOPHILS # BLD: 0 X10(3) UL (ref 0–0.2)
BASOPHILS NFR BLD: 0 %
BASOPHILS NFR BLD: 0 %
BILIRUB SERPL-MCNC: 1.7 MG/DL (ref 0.1–2)
BILIRUB UR QL STRIP.AUTO: NEGATIVE
BUN BLD-MCNC: 22 MG/DL (ref 7–18)
BUN BLD-MCNC: 24 MG/DL (ref 7–18)
CALCIUM BLD-MCNC: 7.1 MG/DL (ref 8.5–10.1)
CALCIUM BLD-MCNC: 7.5 MG/DL (ref 8.5–10.1)
CHLORIDE SERPL-SCNC: 100 MMOL/L (ref 98–112)
CHLORIDE SERPL-SCNC: 104 MMOL/L (ref 98–112)
CLARITY UR REFRACT.AUTO: CLEAR
CO2 SERPL-SCNC: 23 MMOL/L (ref 21–32)
CO2 SERPL-SCNC: 25 MMOL/L (ref 21–32)
COLOR UR AUTO: YELLOW
CREAT BLD-MCNC: 1.07 MG/DL
CREAT BLD-MCNC: 1.21 MG/DL
EOSINOPHIL # BLD: 0 X10(3) UL (ref 0–0.7)
EOSINOPHIL # BLD: 0 X10(3) UL (ref 0–0.7)
EOSINOPHIL NFR BLD: 0 %
EOSINOPHIL NFR BLD: 0 %
ERYTHROCYTE [DISTWIDTH] IN BLOOD BY AUTOMATED COUNT: 14.6 %
ERYTHROCYTE [DISTWIDTH] IN BLOOD BY AUTOMATED COUNT: 14.8 %
GLOBULIN PLAS-MCNC: 4.3 G/DL (ref 2.8–4.4)
GLUCOSE BLD-MCNC: 81 MG/DL (ref 70–99)
GLUCOSE BLD-MCNC: 95 MG/DL (ref 70–99)
GLUCOSE UR STRIP.AUTO-MCNC: NEGATIVE MG/DL
HCT VFR BLD AUTO: 29.8 %
HCT VFR BLD AUTO: 33.3 %
HGB BLD-MCNC: 10.8 G/DL
HGB BLD-MCNC: 9.8 G/DL
KETONES UR STRIP.AUTO-MCNC: NEGATIVE MG/DL
LACTATE SERPL-SCNC: 1.6 MMOL/L (ref 0.4–2)
LEUKOCYTE ESTERASE UR QL STRIP.AUTO: NEGATIVE
LYMPHOCYTES NFR BLD: 0.36 X10(3) UL (ref 1–4)
LYMPHOCYTES NFR BLD: 0.41 X10(3) UL (ref 1–4)
LYMPHOCYTES NFR BLD: 68 %
LYMPHOCYTES NFR BLD: 72 %
MCH RBC QN AUTO: 26.7 PG (ref 26–34)
MCH RBC QN AUTO: 26.9 PG (ref 26–34)
MCHC RBC AUTO-ENTMCNC: 32.4 G/DL (ref 31–37)
MCHC RBC AUTO-ENTMCNC: 32.9 G/DL (ref 31–37)
MCV RBC AUTO: 81.9 FL
MCV RBC AUTO: 82.4 FL
MONOCYTES # BLD: 0.02 X10(3) UL (ref 0.1–1)
MONOCYTES # BLD: 0.05 X10(3) UL (ref 0.1–1)
MONOCYTES NFR BLD: 4 %
MONOCYTES NFR BLD: 8 %
MORPHOLOGY: NORMAL
MORPHOLOGY: NORMAL
NEUTROPHILS # BLD AUTO: 0.05 X10 (3) UL (ref 1.5–7.7)
NEUTROPHILS # BLD AUTO: 0.08 X10 (3) UL (ref 1.5–7.7)
NEUTROPHILS NFR BLD: 20 %
NEUTROPHILS NFR BLD: 24 %
NEUTS BAND NFR BLD: 4 %
NEUTS HYPERSEG # BLD: 0.12 X10(3) UL (ref 1.5–7.7)
NEUTS HYPERSEG # BLD: 0.14 X10(3) UL (ref 1.5–7.7)
NITRITE UR QL STRIP.AUTO: NEGATIVE
OSMOLALITY SERPL CALC.SUM OF ELEC: 274 MOSM/KG (ref 275–295)
OSMOLALITY SERPL CALC.SUM OF ELEC: 276 MOSM/KG (ref 275–295)
PH UR STRIP.AUTO: 7 [PH] (ref 5–8)
PLATELET # BLD AUTO: 151 10(3)UL (ref 150–450)
PLATELET # BLD AUTO: 186 10(3)UL (ref 150–450)
PLATELET MORPHOLOGY: NORMAL
PLATELET MORPHOLOGY: NORMAL
POTASSIUM SERPL-SCNC: 4.4 MMOL/L (ref 3.5–5.1)
POTASSIUM SERPL-SCNC: 4.6 MMOL/L (ref 3.5–5.1)
PROT SERPL-MCNC: 6.7 G/DL (ref 6.4–8.2)
PROT UR STRIP.AUTO-MCNC: 30 MG/DL
RBC # BLD AUTO: 3.64 X10(6)UL
RBC # BLD AUTO: 4.04 X10(6)UL
RBC UR QL AUTO: NEGATIVE
SARS-COV-2 RNA RESP QL NAA+PROBE: NOT DETECTED
SODIUM SERPL-SCNC: 130 MMOL/L (ref 136–145)
SODIUM SERPL-SCNC: 132 MMOL/L (ref 136–145)
SP GR UR STRIP.AUTO: 1.02 (ref 1–1.03)
TOTAL CELLS COUNTED: 25
TOTAL CELLS COUNTED: 25
UROBILINOGEN UR STRIP.AUTO-MCNC: 4 MG/DL
WBC # BLD AUTO: 0.5 X10(3) UL (ref 4–11)
WBC # BLD AUTO: 0.6 X10(3) UL (ref 4–11)

## 2021-09-20 PROCEDURE — 71045 X-RAY EXAM CHEST 1 VIEW: CPT | Performed by: EMERGENCY MEDICINE

## 2021-09-20 PROCEDURE — 99245 OFF/OP CONSLTJ NEW/EST HI 55: CPT | Performed by: INTERNAL MEDICINE

## 2021-09-20 PROCEDURE — 99220 INITIAL OBSERVATION CARE,LEVL III: CPT | Performed by: INTERNAL MEDICINE

## 2021-09-20 RX ORDER — SODIUM CHLORIDE 9 MG/ML
INJECTION, SOLUTION INTRAVENOUS CONTINUOUS
Status: ACTIVE | OUTPATIENT
Start: 2021-09-20 | End: 2021-09-20

## 2021-09-20 RX ORDER — MELATONIN
3 NIGHTLY PRN
Status: DISCONTINUED | OUTPATIENT
Start: 2021-09-20 | End: 2021-09-23

## 2021-09-20 RX ORDER — HYDROCODONE BITARTRATE AND ACETAMINOPHEN 5; 325 MG/1; MG/1
2 TABLET ORAL EVERY 4 HOURS PRN
Status: DISCONTINUED | OUTPATIENT
Start: 2021-09-20 | End: 2021-09-23

## 2021-09-20 RX ORDER — TRAMADOL HYDROCHLORIDE 50 MG/1
50 TABLET ORAL EVERY 6 HOURS PRN
Status: DISCONTINUED | OUTPATIENT
Start: 2021-09-20 | End: 2021-09-23

## 2021-09-20 RX ORDER — SODIUM PHOSPHATE, DIBASIC AND SODIUM PHOSPHATE, MONOBASIC 7; 19 G/133ML; G/133ML
1 ENEMA RECTAL ONCE AS NEEDED
Status: DISCONTINUED | OUTPATIENT
Start: 2021-09-20 | End: 2021-09-23

## 2021-09-20 RX ORDER — ONDANSETRON 2 MG/ML
4 INJECTION INTRAMUSCULAR; INTRAVENOUS EVERY 6 HOURS PRN
Status: DISCONTINUED | OUTPATIENT
Start: 2021-09-20 | End: 2021-09-23

## 2021-09-20 RX ORDER — PREDNISONE 1 MG/1
5 TABLET ORAL DAILY
Status: ON HOLD | COMMUNITY
End: 2021-09-20

## 2021-09-20 RX ORDER — PANTOPRAZOLE SODIUM 40 MG/1
40 TABLET, DELAYED RELEASE ORAL
Status: DISCONTINUED | OUTPATIENT
Start: 2021-09-20 | End: 2021-09-23

## 2021-09-20 RX ORDER — BISACODYL 10 MG
10 SUPPOSITORY, RECTAL RECTAL
Status: DISCONTINUED | OUTPATIENT
Start: 2021-09-20 | End: 2021-09-23

## 2021-09-20 RX ORDER — POLYETHYLENE GLYCOL 3350 17 G/17G
17 POWDER, FOR SOLUTION ORAL DAILY PRN
Status: DISCONTINUED | OUTPATIENT
Start: 2021-09-20 | End: 2021-09-23

## 2021-09-20 RX ORDER — HYDROCODONE BITARTRATE AND ACETAMINOPHEN 5; 325 MG/1; MG/1
1 TABLET ORAL EVERY 4 HOURS PRN
Status: DISCONTINUED | OUTPATIENT
Start: 2021-09-20 | End: 2021-09-23

## 2021-09-20 RX ORDER — PREDNISONE 1 MG/1
5 TABLET ORAL DAILY
Status: DISCONTINUED | OUTPATIENT
Start: 2021-09-20 | End: 2021-09-23

## 2021-09-20 RX ORDER — TRAMADOL HYDROCHLORIDE 50 MG/1
50 TABLET ORAL ONCE
Status: COMPLETED | OUTPATIENT
Start: 2021-09-20 | End: 2021-09-20

## 2021-09-20 RX ORDER — ACETAMINOPHEN 325 MG/1
650 TABLET ORAL EVERY 4 HOURS PRN
Status: DISCONTINUED | OUTPATIENT
Start: 2021-09-20 | End: 2021-09-23

## 2021-09-20 RX ORDER — SODIUM CHLORIDE, SODIUM LACTATE, POTASSIUM CHLORIDE, CALCIUM CHLORIDE 600; 310; 30; 20 MG/100ML; MG/100ML; MG/100ML; MG/100ML
INJECTION, SOLUTION INTRAVENOUS CONTINUOUS
Status: DISCONTINUED | OUTPATIENT
Start: 2021-09-20 | End: 2021-09-20

## 2021-09-20 NOTE — PHYSICAL THERAPY NOTE
PHYSICAL THERAPY EVALUATION - INPATIENT     Room Number: 431/431-A  Evaluation Date: 9/20/2021  Type of Evaluation: Initial  Physician Order: PT Eval and Treat    Presenting Problem: fever, LE pain  Reason for Therapy: Mobility Dysfunction and Discha within functional limits  · Orientation Level:  oriented x4  · Following Commands:  follows multistep commands with increased time and follows multistep commands with repetition  · Safety Judgement:  decreased awareness of need for assistance and decreased tested  Comment : limited by pain    Skilled Therapy Provided: Patient received in bathroom with daughter. Patient required mod a for commode transfer due to seat height with cues.   Assist for brief and pants in standing with min a for balanceGait trained Treatment Plan: Bed mobility; Endurance; Energy conservation; Patient education;  Family education; Gait training; Strengthening; Stair training; Transfer training; Balance training  Rehab Potential : Fair  Frequency (Obs): 3-5x/week  Number of Visits to Me

## 2021-09-20 NOTE — CONSULTS
Hematology/Oncology Initial Consultation Note    Patient Name: Jeremías French  Medical Record Number: [id number]    YOB: 1940   Date of Consultation: 9/20/2021   Physician requesting consultation: Dr. Sander Nunez    Reason for Consultation: doppler- extensive acute DVT extending from the right common femoral vein to the distal posterior tibial vein with extension into the right saphenofemoral junction.  There is enlarged right inguinal lymph node measuring 2.5 x 1.2 cm.   -12/23/19 and again • Hearing impairment    • Metastasis to liver [facility])    • Muscle weakness    • Personal history of antineoplastic chemotherapy        Past Surgical History:   Procedure Laterality Date   • EYE SURGERY         Home Medications:  No current facility-adminis 5282)  Do Not Use - Resp Rate: --  SpO2: 95 % (09/20 0317)    Wt Readings from Last 6 Encounters:  09/20/21 : 60.3 kg (133 lb)  04/14/21 : 63 kg (138 lb 14.2 oz)  03/15/21 : 61.2 kg (135 lb)  12/24/19 : 82.4 kg (181 lb 10.5 oz)    Physical Examination:  Ge with partially occlusive DVT involving the right common femoral vein.  Previously this was seen to involve the common femoral vein and proximal superficial as well as deep femoral vein as well.  .     Impression & Plan:     *Neutropenic fever  -Due to recen

## 2021-09-20 NOTE — PLAN OF CARE
Admitted from home. Lives with family. Diagnosed wiith prostate cancer this year. Had liver biopsy. Started chemo last wenesday. Has had loss of appetite, nasuea, and fever. Neutropenic. Started on IV antibiotics and IVF.   Family at bedside for rodas

## 2021-09-20 NOTE — OCCUPATIONAL THERAPY NOTE
OCCUPATIONAL THERAPY EVALUATION - INPATIENT     Room Number: 431/431-A  Evaluation Date: 9/20/2021  Type of Evaluation: Initial  Presenting Problem: neutropenic fever, metastatic prostate cancer    Physician Order: IP Consult to Occupational Therapy  Ade Status:  WFL - within functional limits    VISION  Current Vision: no visual deficits    Behavioral/Emotional/Social: pleasant    RANGE OF MOTION AND STRENGTH ASSESSMENT  Upper extremity ROM is within functional limits     Upper extremity strength is with[age] year old male admitted on 9/19/2021 for B leg pain and neutropenic fever. Complete medical history and occupational profile noted above. Functional outcome measures completed include ROM.  In this OT evaluation patient presents with the following performanc supervision    UE Exercise Program Goal  Patient will be independent with bilateral AROM HEP (home exercise program). Additional Goals:  Pt will recall at least 3 energy conservation principles that can be used during ADL.     PPE worn by this OT: goggle

## 2021-09-20 NOTE — ED INITIAL ASSESSMENT (HPI)
Fever of 101 at home. Pt with hx of Ca, originating in prostate. Pt family's states CA has metastases. Pt denies pain.

## 2021-09-20 NOTE — DIETARY NOTE
[facility]    NUTRITION ASSESSMENT    Pt does not meet malnutrition criteria. NUTRITION INTERVENTION:       1. Meal and Snacks - monitor patient po intake. Encourage adequate po of appropriate diet.   2. Medical Food Supplements - RD added Ensure (1.3-1.8 grams protein per kg)  Fluid: ~1 ml/kcal or per MD discretion    NUTRITION DIAGNOSIS/PROBLEM:  Inadequate energy intake related to insufficient appetite resulting in inadequate nutrition intake as evidenced by documented/reported insufficient oral

## 2021-09-20 NOTE — PLAN OF CARE
Pt is A/O x3, VSS, afebrile. Pain 3/10, tramadol given. RA, no tele. Extended length right PIV placed today. Right AC PIV removed per pt/family request. Up to bathroom. Home health is recommended. Language barrier, family at bedside.  Morning draw showed lo INTERVENTIONS:  - Support and protect limb and body alignment per provider's orders  - Instruct and reinforce with patient and family use of appropriate assistive device and precautions (e.g. spinal or hip dislocation precautions)  Outcome: Progressing

## 2021-09-20 NOTE — PROGRESS NOTES
Pharmacy Note: Renal dose adjustment of Cefepime    Preetam Noe Linares is a 79year old male who has been prescribed Cefepime 1gm every 8 hours.   CrCl is 39.4 ml/min so the dose has been adjusted  to 1gm IV every 12 hours per hospital renal dose adjustment prot

## 2021-09-20 NOTE — H&P
[facility]  History and Physical     Samira Cheng Patient Status:  Observation    1940 MRN [id number]   [facility] 4NW-A Attending Burgess Gerald MD   Hosp Day # 0 PCP Leslie Vazquez     Chief Complaint: leg pain, fe Pain., Disp: , Rfl:   omeprazole 20 MG Oral Capsule Delayed Release, Take 20 mg by mouth every morning., Disp: , Rfl:   Abiraterone Acetate 250 MG Oral Tab, Take 250 mg by mouth daily.  (Patient not taking: No sig reported), Disp: , Rfl:   dexamethasone 2 M Results    COVID-19  Lab Results   Component Value Date    COVID19 Not Detected 09/20/2021    COVID19 Not Detected 03/15/2021       Pro-Calcitonin  No results for input(s): PCT in the last 168 hours.     Cardiac  No results for input(s): TROP, PBNP in the l

## 2021-09-20 NOTE — ED PROVIDER NOTES
Patient Seen in: [facility] Emergency Department      History   Patient presents with:  Fever    Stated Complaint: temp 101, chemo 5 days ago    Subjective:   HPI    This is a pleasant 19-year-old male with metastatic prostate cancer presenting with neurologic exam       ED Course     Labs Reviewed   COMP METABOLIC PANEL (14) - Abnormal; Notable for the following components:       Result Value    Sodium 130 (*)     BUN 24 (*)     Calcium, Total 7.5 (*)     Calculated Osmolality 274 (*)     GFR, Non-Af chest x-ray are within acceptable limits. Urinalysis is still currently pending.   Patient will be admitted for neutropenic fever meropenem was ordered patient was discussed with the hospitalist as well as oncology and will be admitted at this time  Admiss

## 2021-09-21 LAB
BASOPHILS # BLD AUTO: 0 X10(3) UL (ref 0–0.2)
BASOPHILS NFR BLD AUTO: 0 %
BILIRUB UR QL STRIP.AUTO: NEGATIVE
C DIFF TOX B STL QL: NEGATIVE
CLARITY UR REFRACT.AUTO: CLEAR
COLOR UR AUTO: YELLOW
EOSINOPHIL # BLD AUTO: 0.01 X10(3) UL (ref 0–0.7)
EOSINOPHIL NFR BLD AUTO: 1.5 %
ERYTHROCYTE [DISTWIDTH] IN BLOOD BY AUTOMATED COUNT: 15.1 %
GLUCOSE UR STRIP.AUTO-MCNC: NEGATIVE MG/DL
HCT VFR BLD AUTO: 30 %
HGB BLD-MCNC: 9.6 G/DL
IMM GRANULOCYTES # BLD AUTO: 0.01 X10(3) UL (ref 0–1)
IMM GRANULOCYTES NFR BLD: 1.5 %
KETONES UR STRIP.AUTO-MCNC: NEGATIVE MG/DL
LEUKOCYTE ESTERASE UR QL STRIP.AUTO: NEGATIVE
LYMPHOCYTES # BLD AUTO: 0.42 X10(3) UL (ref 1–4)
LYMPHOCYTES NFR BLD AUTO: 61.8 %
MCH RBC QN AUTO: 26.6 PG (ref 26–34)
MCHC RBC AUTO-ENTMCNC: 32 G/DL (ref 31–37)
MCV RBC AUTO: 83.1 FL
MONOCYTES # BLD AUTO: 0.22 X10(3) UL (ref 0.1–1)
MONOCYTES NFR BLD AUTO: 32.4 %
NEUTROPHILS # BLD AUTO: 0.02 X10 (3) UL (ref 1.5–7.7)
NEUTROPHILS # BLD AUTO: 0.02 X10(3) UL (ref 1.5–7.7)
NEUTROPHILS NFR BLD AUTO: 2.8 %
NITRITE UR QL STRIP.AUTO: NEGATIVE
PH UR STRIP.AUTO: 6 [PH] (ref 5–8)
PLATELET # BLD AUTO: 189 10(3)UL (ref 150–450)
PROT UR STRIP.AUTO-MCNC: NEGATIVE MG/DL
RBC # BLD AUTO: 3.61 X10(6)UL
RBC UR QL AUTO: NEGATIVE
SP GR UR STRIP.AUTO: 1.01 (ref 1–1.03)
UROBILINOGEN UR STRIP.AUTO-MCNC: <2 MG/DL
WBC # BLD AUTO: 0.7 X10(3) UL (ref 4–11)

## 2021-09-21 PROCEDURE — 99225 SUBSEQUENT OBSERVATION CARE: CPT | Performed by: INTERNAL MEDICINE

## 2021-09-21 PROCEDURE — 99225 SUBSEQUENT OBSERVATION CARE: CPT | Performed by: HOSPITALIST

## 2021-09-21 NOTE — PROGRESS NOTES
Hematology/Oncology Progress Note    Patient Name: Crispin Garza  Medical Record Number: [id number]    YOB: 1940     Reason for Consultation:  Crispin Garza was seen today for the diagnosis of metastatic prostate cancer    Interval events: Fe Lab 09/20/21  0014 09/20/21  0659   * 132*   K 4.6 4.4    104   CO2 25.0 23.0   BUN 24* 22*   CREATSERUM 1.21 1.07   GFRAA 64 74   GFRNAA 55* 64   GLU 95 81   CA 7.5* 7.1*   TP 6.7  --    ALB 2.4*  --    ALKPHO 189*  --    AST 54*  --    ALT

## 2021-09-21 NOTE — PROGRESS NOTES
Alert and orientated x4. Family at bedside to help translate. Had a bowel movment tonite. Voiding well. Afebrile. Took ultram for pain. Ambulating with assistance. All questions answered.

## 2021-09-21 NOTE — PLAN OF CARE
A/O x3, VSS, afebrile. Pain 3/10, PRN pain med given. Meds and abx given per MAR. RA, no tele. Language barrier, family at bedside. Pt is having loose stools, enteric/contact plus isolation per protocol, C. Diff negative.  Pt c/o painful urination, UA pendi provider's orders  - Instruct and reinforce with patient and family use of appropriate assistive device and precautions (e.g. spinal or hip dislocation precautions)  Outcome: Progressing     Problem: Impaired Functional Mobility  Goal: Achieve highest/safe

## 2021-09-21 NOTE — PROGRESS NOTES
[facility]     Hospitalist Progress Note     [facility] Patient Status:  Observation    1940 MRN [id number]   [facility] 4NW-A Attending Katrin Pace MD   Hosp Day # 0 PCP Kirk Layman     Chief Complaint: neutropen hours.    Inflammatory Markers  No results for input(s): CRP, RICARDO, LDH, DDIMER in the last 168 hours. Imaging: Imaging data reviewed in Epic.     Medications:   • cefepime  1 g Intravenous Q12H   • pantoprazole  40 mg Oral QAM AC   • rivaroxaban  10 mg O

## 2021-09-22 LAB
BASOPHILS # BLD: 0 X10(3) UL (ref 0–0.2)
BASOPHILS NFR BLD: 0 %
EOSINOPHIL # BLD: 0.03 X10(3) UL (ref 0–0.7)
EOSINOPHIL NFR BLD: 2 %
ERYTHROCYTE [DISTWIDTH] IN BLOOD BY AUTOMATED COUNT: 14.6 %
HCT VFR BLD AUTO: 29 %
HGB BLD-MCNC: 9.5 G/DL
LYMPHOCYTES NFR BLD: 0.6 X10(3) UL (ref 1–4)
LYMPHOCYTES NFR BLD: 46 %
MCH RBC QN AUTO: 26.6 PG (ref 26–34)
MCHC RBC AUTO-ENTMCNC: 32.8 G/DL (ref 31–37)
MCV RBC AUTO: 81.2 FL
MONOCYTES # BLD: 0.15 X10(3) UL (ref 0.1–1)
MONOCYTES NFR BLD: 12 %
NEUTROPHILS # BLD AUTO: 0.34 X10 (3) UL (ref 1.5–7.7)
NEUTROPHILS NFR BLD: 31 %
NEUTS BAND NFR BLD: 10 %
NEUTS HYPERSEG # BLD: 0.53 X10(3) UL (ref 1.5–7.7)
PLATELET # BLD AUTO: 170 10(3)UL (ref 150–450)
PLATELET MORPHOLOGY: NORMAL
RBC # BLD AUTO: 3.57 X10(6)UL
TOTAL CELLS COUNTED: 52
WBC # BLD AUTO: 1.3 X10(3) UL (ref 4–11)

## 2021-09-22 PROCEDURE — 99225 SUBSEQUENT OBSERVATION CARE: CPT | Performed by: INTERNAL MEDICINE

## 2021-09-22 RX ORDER — SIMETHICONE 80 MG
80 TABLET,CHEWABLE ORAL 4 TIMES DAILY PRN
Status: DISCONTINUED | OUTPATIENT
Start: 2021-09-22 | End: 2021-09-23

## 2021-09-22 RX ORDER — HYDROCORTISONE 25 MG/G
CREAM TOPICAL 2 TIMES DAILY
Status: DISCONTINUED | OUTPATIENT
Start: 2021-09-22 | End: 2021-09-23

## 2021-09-22 NOTE — PHYSICAL THERAPY NOTE
PHYSICAL THERAPY TREATMENT NOTE - INPATIENT    Room Number: 431/431-A     Session: 1  Number of Visits to Meet Established Goals: 5    Presenting Problem: fever, LE pain     History related to current admission: Pt was admitted from home on 9/19 with paxton chair with arms (e.g., wheelchair, bedside commode, etc.): None   -   Moving from lying on back to sitting on the side of the bed?: None   How much help from another person does the patient currently need. ..   -   Moving to and from a bed to a chair (inclu DISCHARGE RECOMMENDATIONS  PT Discharge Recommendations: 24 hour care/supervision; Home with home health PT     PLAN  Patient currently does not meet criteria for skilled inpatient physical therapy services, however patient will remain on Inpatient Mob

## 2021-09-22 NOTE — PLAN OF CARE
Afebrile. Reports had x1 loose stool overnight. Appetite is improving, eating food from home. Ambulates to the bathroom with a walker and standby assist. Prn tramadol given with effective. Fall precaution maintained. Slept.    Problem: GASTROINTESTINAL - AD Progress Notes by Rivas Grimaldo MD at 01/31/18 11:05 AM     Author:  Rivas Grimaldo MD Service:  (none) Author Type:  Physician     Filed:  01/31/18 11:05 AM Encounter Date:  1/26/2018 Status:  Signed     :  Rivas Grimaldo MD (Physician)            Please inform patient labs show a couple of significant abnormalities  He has high levels of antibody to gluten-consistent with a diagnosis of celiac disease (basically allergy to gluten)  This can manifest with a number of symptoms, some of which he has  The treatment for this is a gluten-free diet-I would suggest he read up on this on the Internet as there is a lot of good information out there  He also has a significant vitamin D deficiency-this can actually be related to the gluten issue, as inflammation of the small intestine will lead to malabsorption of vitamin D  Recommend he start over-the-counter vitamin D supplements 1000 units daily  Follow up with me in 3 months with repeat 25 [place] vitamin D level  If he would like to be seen sooner to discuss the celiac disease in more detail, please have him schedule[MP1.1M]    Revision History        User Key Date/Time User Provider Type Action    > MP1.1 01/31/18 11:05 AM Rivas Grimaldo MD Physician Sign    M - Manual

## 2021-09-22 NOTE — PROGRESS NOTES
Hematology/Oncology Progress Note    Patient Name: Crispin Garza  Medical Record Number: [id number]    YOB: 1940     Reason for Consultation:  Crispin Garza was seen today for the diagnosis of metastatic prostate cancer    Interval events: He Lab 09/20/21  0014 09/20/21  0659   * 132*   K 4.6 4.4    104   CO2 25.0 23.0   BUN 24* 22*   CREATSERUM 1.21 1.07   GFRAA 64 74   GFRNAA 55* 64   GLU 95 81   CA 7.5* 7.1*   TP 6.7  --    ALB 2.4*  --    ALKPHO 189*  --    AST 54*  --    ALT cancer +/- docetaxel. Family feels the reduction in prednisone dosing seems to be somewhat related. Better the last couple days.  -Continue prednisone 5 mg daily for now.   Tramadol as needed    *hx of extensive proximal RLE DVT 12/2019  -s/p thrombectomy

## 2021-09-23 VITALS
HEART RATE: 81 BPM | TEMPERATURE: 99 F | OXYGEN SATURATION: 96 % | SYSTOLIC BLOOD PRESSURE: 134 MMHG | BODY MASS INDEX: 22.71 KG/M2 | WEIGHT: 133 LBS | DIASTOLIC BLOOD PRESSURE: 52 MMHG | HEIGHT: 64 IN | RESPIRATION RATE: 18 BRPM

## 2021-09-23 LAB
BASOPHILS # BLD: 0 X10(3) UL (ref 0–0.2)
BASOPHILS NFR BLD: 0 %
DOHLE BOD BLD QL SMEAR: PRESENT
EOSINOPHIL # BLD: 0 X10(3) UL (ref 0–0.7)
EOSINOPHIL NFR BLD: 0 %
ERYTHROCYTE [DISTWIDTH] IN BLOOD BY AUTOMATED COUNT: 15.3 %
HCT VFR BLD AUTO: 28.7 %
HGB BLD-MCNC: 9.4 G/DL
LYMPHOCYTES NFR BLD: 1.41 X10(3) UL (ref 1–4)
LYMPHOCYTES NFR BLD: 19 %
MCH RBC QN AUTO: 26.4 PG (ref 26–34)
MCHC RBC AUTO-ENTMCNC: 32.8 G/DL (ref 31–37)
MCV RBC AUTO: 80.6 FL
METAMYELOCYTES # BLD: 0.81 X10(3) UL
METAMYELOCYTES NFR BLD: 11 %
MONOCYTES # BLD: 0.59 X10(3) UL (ref 0.1–1)
MONOCYTES NFR BLD: 8 %
MYELOCYTES # BLD: 1.18 X10(3) UL
MYELOCYTES NFR BLD: 16 %
NEUTROPHILS # BLD AUTO: 3.94 X10 (3) UL (ref 1.5–7.7)
NEUTROPHILS NFR BLD: 36 %
NEUTS BAND NFR BLD: 9 %
NEUTS HYPERSEG # BLD: 3.33 X10(3) UL (ref 1.5–7.7)
NRBC BLD MANUAL-RTO: 2 %
PLATELET # BLD AUTO: 166 10(3)UL (ref 150–450)
PLATELET MORPHOLOGY: NORMAL
PROMYELOCYTES # BLD: 0.07 X10(3) UL
PROMYELOCYTES NFR BLD: 1 %
RBC # BLD AUTO: 3.56 X10(6)UL
TOTAL CELLS COUNTED: 100
TOXIC GRANULES BLD QL SMEAR: PRESENT
WBC # BLD AUTO: 7.4 X10(3) UL (ref 4–11)

## 2021-09-23 PROCEDURE — 99217 OBSERVATION CARE DISCHARGE: CPT | Performed by: INTERNAL MEDICINE

## 2021-09-23 PROCEDURE — 99225 SUBSEQUENT OBSERVATION CARE: CPT | Performed by: INTERNAL MEDICINE

## 2021-09-23 RX ORDER — AMOXICILLIN AND CLAVULANATE POTASSIUM 875; 125 MG/1; MG/1
1 TABLET, FILM COATED ORAL 2 TIMES DAILY
Qty: 14 TABLET | Refills: 0 | Status: SHIPPED | OUTPATIENT
Start: 2021-09-23 | End: 2021-09-30

## 2021-09-23 NOTE — CM/SW NOTE
09/23/21 1000   CM/SW Referral Data   Referral Source Social Work (self-referral)   Reason for Referral Discharge planning   Informant Son   Patient [address]   Patient lives with Daughter   Patient Status Prior to Admissio

## 2021-09-23 NOTE — PROGRESS NOTES
PATIENT DISCHARGED TO HOME EXPLAINED MEDICATIONS TO PATIENT AND HIS SON. HE VERBALIZED HIS UNDERSTANDING OF TEACHING.

## 2021-09-23 NOTE — PLAN OF CARE
Patient is alert and oriented x4. RA. VSS. Afebrile. C/o mild lower back pain; relief with nonpharmacologic interventions. Tolerating diet. No N/V/D. Ambulates with walker and standby assist. IV abx. IVF infusing.  Family at bedside and updated on plan of c

## 2021-09-23 NOTE — PROGRESS NOTES
Hematology/Oncology Progress Note    Patient Name: Marino Calixto  Medical Record Number: [id number]    YOB: 1940     Reason for Consultation:  Marino Calixto was seen today for the diagnosis of metastatic prostate cancer    Interval events: He GLU 95 81   CA 7.5* 7.1*   TP 6.7  --    ALB 2.4*  --    ALKPHO 189*  --    AST 54*  --    ALT 21  --    BILT 1.7  --        No results for input(s): PT, INR, PTT, FIB in the last 168 hours.        9/20: bld cx x 2 (peripheral x 2)- NGTD    Imaging:    CX 1001 [facility]

## 2021-11-05 ENCOUNTER — APPOINTMENT (OUTPATIENT)
Dept: CT IMAGING | Facility: HOSPITAL | Age: 81
End: 2021-11-05
Attending: EMERGENCY MEDICINE
Payer: MEDICAID

## 2021-11-05 ENCOUNTER — HOSPITAL ENCOUNTER (EMERGENCY)
Facility: HOSPITAL | Age: 81
Discharge: HOME | End: 2021-11-05
Attending: EMERGENCY MEDICINE
Payer: MEDICAID

## 2021-11-05 VITALS
BODY MASS INDEX: 25.34 KG/M2 | RESPIRATION RATE: 16 BRPM | TEMPERATURE: 97 F | HEIGHT: 63 IN | DIASTOLIC BLOOD PRESSURE: 67 MMHG | OXYGEN SATURATION: 96 % | WEIGHT: 143 LBS | HEART RATE: 87 BPM | SYSTOLIC BLOOD PRESSURE: 127 MMHG

## 2021-11-05 DIAGNOSIS — M54.2 NECK PAIN: ICD-10-CM

## 2021-11-05 DIAGNOSIS — S09.90XA INJURY OF HEAD, INITIAL ENCOUNTER: Primary | ICD-10-CM

## 2021-11-05 PROCEDURE — 70450 CT HEAD/BRAIN W/O DYE: CPT | Performed by: EMERGENCY MEDICINE

## 2021-11-05 PROCEDURE — 99284 EMERGENCY DEPT VISIT MOD MDM: CPT

## 2021-11-05 PROCEDURE — 72125 CT NECK SPINE W/O DYE: CPT | Performed by: EMERGENCY MEDICINE

## 2021-11-05 NOTE — ED INITIAL ASSESSMENT (HPI)
Patient fell 3 days ago while putting on his sock injuring his left arm and hit his head against the wall. Denies any LOC. Patient reports headache and dizziness since it happened, also has a stiff neck and bilateral shoulder pain.   Patient takes xarelto

## 2021-11-05 NOTE — ED PROVIDER NOTES
Patient Seen in: [facility] Emergency Department      History   Patient presents with:  Trauma    Stated Complaint: fell 3 days ago, on blood thinners, dizziness  chemo patient.       Subjective:   HPI    65-year-old male history of metastatic prosta Exam    Vital signs reviewed. Nursing note reviewed.   Constitutional: Alert, well-appearing  Head: Normocephalic, atraumatic  Mouth: Moist  Eyes: Extraocular muscles intact, pupils equal  Cardiovascular: Regular rate and rhythm  Pulmonary: Effort normal, at 2:16 PM     Finalized by (CST): Amandeep Gonzalez DO on 11/05/2021 at 2:17 PM       CT SPINE CERVICAL (CPT=72125)    Result Date: 11/5/2021  PROCEDURE:  CT SPINE CERVICAL (CPT=72125)  COMPARISON:  None.   INDICATIONS:  Headache; Trauma, acute/subacute, witho 11/05/2021 at 2:21 PM              [facility]      CT head no acute issues. CT C-spine no acute fractures. 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   Gave him contact information for [facility] [employer]

## 2022-09-08 NOTE — DISCHARGE SUMMARY
[facility] HOSPITALIST  DISCHARGE SUMMARY     Magali Flores Patient Status:  Observation    1940 MRN [id number]   [facility] 4NW-A Attending No att. providers found   Hosp Day # 0 PCP [facility]     Date of Admission: 2021 START taking these medications      Instructions Prescription details   amoxicillin clavulanate 875-125 MG Tabs  Commonly known as: AUGMENTIN      Take 1 tablet by mouth 2 (two) times daily for 7 days.    Stop taking on: September 30, 2021  Quantity: 14 t INSTRUCTIONS: See electronic chart    Sara Reddy DO    Time spent:  > 30 minutes Erythromycin Pregnancy And Lactation Text: This medication is Pregnancy Category B and is considered safe during pregnancy. It is also excreted in breast milk.

## 2023-03-24 ENCOUNTER — TELEPHONE (OUTPATIENT)
Dept: HEMATOLOGY/ONCOLOGY | Facility: HOSPITAL | Age: 83
End: 2023-03-24

## 2023-03-24 NOTE — TELEPHONE ENCOUNTER
----- Message from Cindy Mendez MD sent at 3/24/2023 10:24 AM CDT -----  Pt is scheduled to see me next week on Wednesday, 3/29 for 2nd opinion consult for prostate cancer. Is only in a 30 min slot. Please call him and request he come at 2:00pm instead of 2:30pm if able to accommodate more time for the visit. If he can't come earlier can stay as is, but it will go smoother if I have more time.      Thanks    layo

## 2023-03-29 ENCOUNTER — APPOINTMENT (OUTPATIENT)
Dept: HEMATOLOGY/ONCOLOGY | Facility: HOSPITAL | Age: 83
End: 2023-03-29
Attending: INTERNAL MEDICINE
Payer: MEDICAID

## (undated) NOTE — LETTER
[facility]  Magnus Loaiza 60 [address]  Consent for Procedure/Sedation    Date: 12/24/19    Time: 0805      1.  I authorize the performance upon Preetam Jara the following: Peripheral angiography, atherectomy, percutaneous transluminal 8. In the event your procedure results in extended X-Ray/fluoroscopy time, you may develop a skin reaction.       Signature of Patient: _______________________________________________________    Signature of person authorized

## (undated) NOTE — LETTER
[facility] [address], [address]  Consent for Procedure/Sedation    Date: 12/23/2019   Time: 1646      1.  I authorize the performance upon Preetam Jara the following: Peripheral angiography, atherectomy, percutaneous translumina you may develop a skin reaction.       Signature of Patient: _______________________________________________________    Signature of person authorized                                           Relationship to  to consent for patient: _______________________

## (undated) NOTE — LETTER
[facility] Testing Department  Phone: [phone]  Right Fax: [phone]    ADDRESSEE INFORMATION: SENDER INFORMATION:   To:   Dr. Chasidy Rivera From: Nicolle Brooks RN     Department: Pre-Admission Testing   Fax Number: [phone] Date: 3/15/2021     P telephone and return the original message to us at [address], [address] via the [facility].   10/5/2018